# Patient Record
Sex: FEMALE | Race: BLACK OR AFRICAN AMERICAN | NOT HISPANIC OR LATINO
[De-identification: names, ages, dates, MRNs, and addresses within clinical notes are randomized per-mention and may not be internally consistent; named-entity substitution may affect disease eponyms.]

---

## 2020-02-27 ENCOUNTER — APPOINTMENT (OUTPATIENT)
Dept: ORTHOPEDIC SURGERY | Facility: CLINIC | Age: 75
End: 2020-02-27
Payer: MEDICARE

## 2020-02-27 VITALS
HEART RATE: 73 BPM | WEIGHT: 158 LBS | HEIGHT: 65 IN | BODY MASS INDEX: 26.33 KG/M2 | SYSTOLIC BLOOD PRESSURE: 171 MMHG | DIASTOLIC BLOOD PRESSURE: 77 MMHG

## 2020-02-27 DIAGNOSIS — Z96.641 PRESENCE OF RIGHT ARTIFICIAL HIP JOINT: ICD-10-CM

## 2020-02-27 PROCEDURE — 99204 OFFICE O/P NEW MOD 45 MIN: CPT

## 2020-02-27 PROCEDURE — 72100 X-RAY EXAM L-S SPINE 2/3 VWS: CPT

## 2020-02-27 PROCEDURE — 73502 X-RAY EXAM HIP UNI 2-3 VIEWS: CPT | Mod: RT

## 2020-02-27 RX ORDER — AMLODIPINE BESYLATE 5 MG/1
5 TABLET ORAL
Refills: 0 | Status: ACTIVE | COMMUNITY

## 2020-02-27 RX ORDER — SIMVASTATIN 20 MG/1
20 TABLET, FILM COATED ORAL
Refills: 0 | Status: ACTIVE | COMMUNITY

## 2021-04-01 ENCOUNTER — APPOINTMENT (OUTPATIENT)
Dept: ORTHOPEDIC SURGERY | Facility: CLINIC | Age: 76
End: 2021-04-01
Payer: MEDICARE

## 2021-04-01 VITALS
WEIGHT: 158 LBS | DIASTOLIC BLOOD PRESSURE: 82 MMHG | BODY MASS INDEX: 26.33 KG/M2 | SYSTOLIC BLOOD PRESSURE: 175 MMHG | HEIGHT: 65 IN | HEART RATE: 77 BPM

## 2021-04-01 DIAGNOSIS — M25.512 PAIN IN LEFT SHOULDER: ICD-10-CM

## 2021-04-01 PROCEDURE — 99072 ADDL SUPL MATRL&STAF TM PHE: CPT

## 2021-04-01 PROCEDURE — 99214 OFFICE O/P EST MOD 30 MIN: CPT | Mod: 25

## 2021-04-01 PROCEDURE — 20610 DRAIN/INJ JOINT/BURSA W/O US: CPT | Mod: LT

## 2021-04-01 PROCEDURE — 73502 X-RAY EXAM HIP UNI 2-3 VIEWS: CPT | Mod: RT

## 2021-04-01 PROCEDURE — 73030 X-RAY EXAM OF SHOULDER: CPT | Mod: LT

## 2021-04-01 NOTE — DISCUSSION/SUMMARY
[de-identified] : The patient has low back pain secondary to lumbar spondylosis.  The right hip replacement is doing well.  She will apply topical ointments and patches.  In terms of her left shoulder she has degenerative disease.  The pathology, natural history and treatment options were discussed.  She has opted to try a steroid injection.\par Informed consent is obtained. Site and procedure were confirmed with the patient.  Following a sterile prep with alcohol an injection is placed into the subacromial space and glenohumeral joint of the left shoulder. The injection consists of 1 cc of Depo-Medrol and 8 cc of 1% Xylocaine. The injection was well tolerated. Instructions were given.\par She will return as needed.

## 2021-04-01 NOTE — PHYSICAL EXAM
[DP] : dorsalis pedis 2+ and symmetric bilaterally [PT] : posterior tibial 2+ and symmetric bilaterally [Rad] : radial 2+ and symmetric bilaterally [Normal] : Alert and in no acute distress [Poor Appearance] : well-appearing [Acute Distress] : not in acute distress [Obese] : not obese [de-identified] : The patient has no respiratory distress. Mood and affect are normal. The patient is alert and oriented to person, place and time.\par Examination of the cervical spine demonstrates no tenderness, no deformity and no muscle spasm. Cervical spine rotation is 60° to the right, 60° to the left, 75° of extension and 45° of flexion. Neurologic exam of the upper extremities reveals intact sensation to light touch. Motor function is 5 over 5 in all groups. Deep tendon reflexes are 2+ and equal at the biceps, triceps and brachioradialis.\par Examination of the left shoulder demonstrates no deformity. The skin is intact. There is no erythema. There is tenderness anteriorly. Impingement sign is positive There is no instability. Drop arm test is negative. Empty can test is negative. Liftoff test is negative. Cameron test is negative.  She has elevation of 130 degrees right and left.  She has internal rotation to the mid lumbar level right and left.  Belly press test is negative.  Elbow motion is full and pain-free.  There is no elbow instability.  The skin is intact.  There is no lymphedema.\par Examination of the lumbar spine demonstrates tenderness to the right of the midline. There is mild muscle spasm. There is no deformity. Lumbar flexion is 90°, right lateral flexion 10° and left lateral flexion 10°. Straight leg raise test is negative. Lower extremity neurologic exam is intact with regard to sensation, motor function and deep tendon reflexes.\par Trendelenburg is negative.  There is no pain with rotation of either hip.  There is a scar on the posterior right hip from prior surgery.  Knees are stable and nontender.  Calves are soft and nontender.  The skin is intact.  There is no lymphedema. [de-identified] : AP and lateral x-rays of the right hip demonstrate total hip replacement in good position.  AP, transscapular and axillary x-rays of the left shoulder demonstrate mild degenerative changes.  X-rays previously taken of the lumbar spine are reviewed.  She has lumbar spondylosis.

## 2023-06-11 ENCOUNTER — EMERGENCY (EMERGENCY)
Facility: HOSPITAL | Age: 78
LOS: 1 days | Discharge: HOME | End: 2023-06-11
Attending: STUDENT IN AN ORGANIZED HEALTH CARE EDUCATION/TRAINING PROGRAM | Admitting: EMERGENCY MEDICINE
Payer: MEDICAID

## 2023-06-11 VITALS
TEMPERATURE: 98 F | OXYGEN SATURATION: 99 % | SYSTOLIC BLOOD PRESSURE: 156 MMHG | DIASTOLIC BLOOD PRESSURE: 68 MMHG | RESPIRATION RATE: 15 BRPM | HEART RATE: 84 BPM

## 2023-06-11 VITALS
DIASTOLIC BLOOD PRESSURE: 72 MMHG | RESPIRATION RATE: 17 BRPM | HEART RATE: 75 BPM | OXYGEN SATURATION: 100 % | SYSTOLIC BLOOD PRESSURE: 169 MMHG

## 2023-06-11 DIAGNOSIS — Z90.710 ACQUIRED ABSENCE OF BOTH CERVIX AND UTERUS: Chronic | ICD-10-CM

## 2023-06-11 DIAGNOSIS — Z96.641 PRESENCE OF RIGHT ARTIFICIAL HIP JOINT: Chronic | ICD-10-CM

## 2023-06-11 PROCEDURE — 93010 ELECTROCARDIOGRAM REPORT: CPT

## 2023-06-11 PROCEDURE — 70450 CT HEAD/BRAIN W/O DYE: CPT | Mod: 26,MA

## 2023-06-11 PROCEDURE — 71250 CT THORAX DX C-: CPT | Mod: 26,MA

## 2023-06-11 PROCEDURE — 99284 EMERGENCY DEPT VISIT MOD MDM: CPT

## 2023-06-11 RX ORDER — ACETAMINOPHEN 500 MG
975 TABLET ORAL ONCE
Refills: 0 | Status: COMPLETED | OUTPATIENT
Start: 2023-06-11 | End: 2023-06-11

## 2023-06-11 RX ORDER — TETANUS TOXOID, REDUCED DIPHTHERIA TOXOID AND ACELLULAR PERTUSSIS VACCINE, ADSORBED 5; 2.5; 8; 8; 2.5 [IU]/.5ML; [IU]/.5ML; UG/.5ML; UG/.5ML; UG/.5ML
0.5 SUSPENSION INTRAMUSCULAR ONCE
Refills: 0 | Status: COMPLETED | OUTPATIENT
Start: 2023-06-11 | End: 2023-06-11

## 2023-06-11 RX ORDER — LIDOCAINE 4 G/100G
1 CREAM TOPICAL ONCE
Refills: 0 | Status: COMPLETED | OUTPATIENT
Start: 2023-06-11 | End: 2023-06-11

## 2023-06-11 RX ADMIN — TETANUS TOXOID, REDUCED DIPHTHERIA TOXOID AND ACELLULAR PERTUSSIS VACCINE, ADSORBED 0.5 MILLILITER(S): 5; 2.5; 8; 8; 2.5 SUSPENSION INTRAMUSCULAR at 15:55

## 2023-06-11 RX ADMIN — LIDOCAINE 1 PATCH: 4 CREAM TOPICAL at 15:54

## 2023-06-11 RX ADMIN — Medication 975 MILLIGRAM(S): at 15:53

## 2023-06-11 NOTE — ED ADULT TRIAGE NOTE - NS ED TRIAGE AVPU SCALE
Alert-The patient is alert, awake and responds to voice. The patient is oriented to time, place, and person. The triage nurse is able to obtain subjective information.
C/o headache, dizziness, and weakness x 3 days

## 2023-06-11 NOTE — ED PROVIDER NOTE - NS ED ROS FT
REVIEW OF SYSTEMS:  CONSTITUTIONAL: No fever, chills, night sweats, or fatigue  EYES: No eye pain, visual disturbances, or discharge  ENMT:  No difficulty hearing, tinnitus, vertigo; No sinus or throat pain  NECK: No pain or stiffness  RESPIRATORY: No cough, wheezing, or hemoptysis; No shortness of breath  CARDIOVASCULAR: + chest pain; No palpitations, dizziness, or leg swelling  GASTROINTESTINAL: No abdominal or epigastric pain. No nausea, vomiting, or hematemesis; No diarrhea or constipation. No melena or hematochezia.  GENITOURINARY: No dysuria, frequency, hematuria, or incontinence  NEUROLOGICAL: No headaches, memory loss, loss of strength, numbness, or tremors  SKIN: No itching, burning, rashes, or lesions   LYMPH NODES: No enlarged glands  ENDOCRINE: No heat or cold intolerance; No hair loss  MUSCULOSKELETAL: No joint pain or swelling; No muscle, back, or extremity pain  PSYCHIATRIC: No depression, anxiety, mood swings, or difficulty sleeping  HEME/LYMPH: No easy bruising, or bleeding gums  ALLERGY AND IMMUNOLOGIC: No hives or eczema

## 2023-06-11 NOTE — ED ADULT NURSE NOTE - OBJECTIVE STATEMENT
ER pt with S/P fall while walking in the street as per pt the street was not level falling straight forward injured the tip of her nose and pain to left side under breast area/rib area, increase up left arm movements, denies dizziness, SOB, no CP, no blur vision at present time, on CM NSR, lungs clear resp. 18-20b/min, Oxygen Sat. 87-98% RA. pt c/o pain up on breathing.   Pending Xray.      Citlalli Lemus RN

## 2023-06-11 NOTE — ED PROVIDER NOTE - CARE PROVIDER_API CALL
Eddie Luque  Internal Medicine  [id number] [place] [address]  Phone: [phone]  Fax: [phone]  Established Patient  Follow Up Time: 4-6 Days

## 2023-06-11 NOTE — ED ADULT NURSE REASSESSMENT NOTE - NS ED NURSE REASSESS COMMENT FT1
break coverage: patient AOx4 awaiting on CT. meds given as ordered. breathing even and unlabored, will continue to monitor.

## 2023-06-11 NOTE — ED PROVIDER NOTE - PROVIDER TOKENS
PROVIDER:[TOKEN:[[id number]:[id number]],FOLLOWUP:[4-6 Days],ESTABLISHEDPATIENT:[T]]

## 2023-06-11 NOTE — ED PROVIDER NOTE - PROGRESS NOTE DETAILS
Patient made aware of CT findings showing rib fractures but otherwise no emergent findings. Patient amendable to D/C.

## 2023-06-11 NOTE — ED PROVIDER NOTE - OBJECTIVE STATEMENT
77 y/o F PMH of DM2, HLD, right hip replacement coming in after fall. Patient says last night around 7:30 pm, she tripped over elevated pavement after hitting her toe and fell landing on her left chest and her nose. She was able to catch her fall before hitting her head. She denies any fever, chills, confusion, chest pain, palpitations, dyspnea, dizziness, syncope. She occasionally takes aspirin with the last time she took aspirin 3 days ago most likely for primary prevention as she denies any previous strokes or MI. She comes to the ED for aching chest pain that worsens with deep inspiration localized to her lower chest. Denies any contusion, ecchymosis except for her nose.

## 2023-06-11 NOTE — ED PROVIDER NOTE - NSFOLLOWUPINSTRUCTIONS_ED_ALL_ED_FT
Rib Fracture WHAT YOU NEED TO KNOW:    A rib fracture is a crack or break in a rib bone. Rib fractures usually heal within 6 weeks. You should be able to return to your usual activities before that time.  Rib Cage    DISCHARGE INSTRUCTIONS:    Call your local emergency number (911 in the [place]) if:    You have trouble breathing.    You have new or increased pain.  Seek care immediately if:    Your pain does not get better, even after treatment.    You have a fever or a cough.  Call your doctor if:    You have questions or concerns about your condition or care.    Medicines: You may need any of the following:    NSAIDs, such as ibuprofen, help decrease swelling, pain, and fever. This medicine is available with or without a doctor's order. NSAIDs can cause stomach bleeding or kidney problems in certain people. If you take blood thinner medicine, always ask your healthcare provider if NSAIDs are safe for you. Always read the medicine label and follow directions.    Prescription pain medicine may be given. Ask your healthcare provider how to take this medicine safely. Some prescription pain medicines contain acetaminophen. Do not take other medicines that contain acetaminophen without talking to your healthcare provider. Too much acetaminophen may cause liver damage. Prescription pain medicine may cause constipation. Ask your healthcare provider how to prevent or treat constipation.    Take your medicine as directed. Contact your healthcare provider if you think your medicine is not helping or if you have side effects. Tell him or her if you are allergic to any medicine. Keep a list of the medicines, vitamins, and herbs you take. Include the amounts, and when and why you take them. Bring the list or the pill bottles to follow-up visits. Carry your medicine list with you in case of an emergency.  Self-care:    Take deep breaths and cough 10 times each hour. This will decrease your risk for a lung infection. Hug a pillow on your injured side to decrease pain while you take deep breaths. Take a deep breath and hold it for as long as you can. Let the air out and then cough. Deep breaths help open your airway. You may be given an incentive spirometer to help you take deep breaths. Put the plastic piece in your mouth and take a slow, deep breath, then let the air out and cough. Repeat these steps 10 times every hour.    Rest and limit activity as directed. Do not pull, push, or lift objects. Start to do more as your pain decreases. Ask your healthcare provider how much activity you can do.    Apply ice on your chest near your fractured rib for 15 to 20 minutes every hour or as directed. Use an ice pack, or put crushed ice in a plastic bag. Cover it with a towel. Ice helps prevent tissue damage and decreases swelling and pain.  Follow up with your doctor as directed: Write down your questions so you remember to ask them during your visits.

## 2023-06-11 NOTE — ED PROVIDER NOTE - NSICDXFAMILYHX_GEN_ALL_CORE_FT
FAMILY HISTORY:  Family history of hypertension in mother, , 90    Sibling  Still living? Unknown  Family history of stroke, Age at diagnosis: Age Unknown

## 2023-06-11 NOTE — ED PROVIDER NOTE - CLINICAL SUMMARY MEDICAL DECISION MAKING FREE TEXT BOX
79 y/o F PMH of DM2, HLD, right hip replacement coming in after mechanical fall with chest pain resulting for trauma.  - Will assess for rib fractures with chest XR 79 y/o F PMH of DM2, HLD, right hip replacement coming in after mechanical fall with chest pain resulting for trauma.  - Will assess for rib fractures with CT

## 2023-06-11 NOTE — ED PROVIDER NOTE - PATIENT PORTAL LINK FT
You can access the FollowMyHealth Patient Portal offered by [facility] by registering at the following website: [email or web address]. By joining [facility]’s FollowMyHealth portal, you will also be able to view your health information using other applications (apps) compatible with our system.

## 2023-06-11 NOTE — ED PROVIDER NOTE - PHYSICAL EXAMINATION
VITALS:   T(C): 36.9 (06-11-23 @ 11:36), Max: 36.9 (06-11-23 @ 11:36)  HR: 84 (06-11-23 @ 11:36) (84 - 84)  BP: 156/68 (06-11-23 @ 11:36) (156/68 - 156/68)  RR: 15 (06-11-23 @ 11:36) (15 - 15)  SpO2: 99% (06-11-23 @ 11:36) (99% - 99%)    PHYSICAL EXAM:     GENERAL: NAD, lying in bed comfortably  HEAD:  Atraumatic, Normocephalic  EYES: EOMI, PERRLA, conjunctiva and sclera clear  ENT: Moist mucous membranes  NECK: Supple, No JVD  CHEST/LUNG: Clear to auscultation bilaterally; No rales, rhonchi, wheezing, or rubs. Unlabored respirations; focal tenderness noted in lower left ribs and right ribs  HEART: Regular rate and rhythm; No murmurs, rubs, or gallops  ABDOMEN: normal bowel sounds; Soft, nontender, nondistended  EXTREMITIES:  2+ Peripheral Pulses, brisk capillary refill. No clubbing, cyanosis, or edema  Neurological:  A&Ox3, no focal deficits   SKIN: No rashes or lesions  PSYCH: normal affect and mood VITALS:   T(C): 36.9 (06-11-23 @ 11:36), Max: 36.9 (06-11-23 @ 11:36)  HR: 84 (06-11-23 @ 11:36) (84 - 84)  BP: 156/68 (06-11-23 @ 11:36) (156/68 - 156/68)  RR: 15 (06-11-23 @ 11:36) (15 - 15)  SpO2: 99% (06-11-23 @ 11:36) (99% - 99%)    PHYSICAL EXAM:     GENERAL: NAD, lying in bed comfortably  HEAD:  Normocephalic; contusion to nose  EYES: EOMI, PERRLA, conjunctiva and sclera clear  ENT: Moist mucous membranes  NECK: Supple, No JVD  CHEST/LUNG: Clear to auscultation bilaterally; No rales, rhonchi, wheezing, or rubs. Unlabored respirations; focal tenderness noted in lower left ribs and right ribs  HEART: Regular rate and rhythm; No murmurs, rubs, or gallops  ABDOMEN: normal bowel sounds; Soft, nontender, nondistended  EXTREMITIES:  2+ Peripheral Pulses, brisk capillary refill. No clubbing, cyanosis, or edema  Neurological:  A&Ox3, no focal deficits   SKIN: No rashes or lesions  PSYCH: normal affect and mood

## 2023-06-11 NOTE — ED ADULT NURSE NOTE - NSFALLHARMRISKINTERV_ED_ALL_ED

## 2023-06-11 NOTE — ED PROVIDER NOTE - ATTENDING CONTRIBUTION TO CARE
77 yo F, hx DM2, HLD, R hip replacement, presenting for evaluation of anterior chest pain s/p mechanical fall last night. Pt reports tripping on uneven concrete and falling forward onto L side of chest. Denies LOC but with abrasion to bridge of nose. On exam, NC, only abrasion to tip of nose with no ttp over facial bones, EOMI. No cervical/spinal ttp, pt with significant anterior cw ttp. BS ctab, heart rrr, FROM all extremities, ambulating at baseline. Plan for CTH r/o bleed, CT chest r/o rib fx, pain meds and reassess

## 2023-06-11 NOTE — ED ADULT TRIAGE NOTE - CHIEF COMPLAINT QUOTE
states" I fell on uneven pavement last night and my left side of the chest and ribs hurts a lot" takes baby asa on and off. denies LOC .

## 2023-08-10 ENCOUNTER — APPOINTMENT (OUTPATIENT)
Dept: ORTHOPEDIC SURGERY | Facility: CLINIC | Age: 78
End: 2023-08-10
Payer: MEDICARE

## 2023-08-10 VITALS — BODY MASS INDEX: 26.33 KG/M2 | WEIGHT: 158 LBS | HEIGHT: 65 IN

## 2023-08-10 DIAGNOSIS — Z96.641 PRESENCE OF RIGHT ARTIFICIAL HIP JOINT: ICD-10-CM

## 2023-08-10 DIAGNOSIS — M47.816 SPONDYLOSIS W/OUT MYELOPATHY OR RADICULOPATHY, LUMBAR REGION: ICD-10-CM

## 2023-08-10 PROCEDURE — 99214 OFFICE O/P EST MOD 30 MIN: CPT | Mod: 25

## 2023-08-10 PROCEDURE — 72100 X-RAY EXAM L-S SPINE 2/3 VWS: CPT

## 2023-08-10 PROCEDURE — 73502 X-RAY EXAM HIP UNI 2-3 VIEWS: CPT

## 2023-08-10 NOTE — PHYSICAL EXAM
[DP] : dorsalis pedis 2+ and symmetric bilaterally [PT] : posterior tibial 2+ and symmetric bilaterally [Normal] : Alert and in no acute distress [Poor Appearance] : well-appearing [Acute Distress] : not in acute distress [Obese] : not obese [de-identified] : The patient has no respiratory distress. Mood and affect are normal. The patient is alert and oriented to person, place and time. Examination of the lumbar spine demonstrates tenderness to the right of the midline. There is mild muscle spasm. There is no deformity. Lumbar flexion is 90°, right lateral flexion 10° and left lateral flexion 10°. Straight leg raise test is negative. Lower extremity neurologic exam is intact with regard to sensation, motor function and deep tendon reflexes. Examination of the right hip demonstrates well-healed scar from prior surgery.  There is no tenderness of the hip or pelvis.  There is no pain with hip range of motion.  There is no pain with knee range of motion.  The calves are soft and nontender.  The skin is intact.  There is no lymphedema. [de-identified] : AP and lateral x-rays of the right hip with AP of the pelvis demonstrates well-fixed right total hip replacement with no evidence of loosening in comparison to prior x-rays.  AP and lateral x-rays of the lumbar spine demonstrate degenerative changes.  There are no fractures or dislocations.

## 2023-08-10 NOTE — REVIEW OF SYSTEMS
Explained to the patient that she should only order enough CL to get her through until her cataract evaluation/surgery, and not to update her glasses if she is proceeding with cataract surgery soon. Patient expressed understanding. [Joint Pain] : joint pain [Negative] : Heme/Lymph

## 2023-08-10 NOTE — DISCUSSION/SUMMARY
[de-identified] : The patient has a well-functioning right total hip replacement.  I believe that her current pain is secondary to her lumbar spine.  She has lumbar spondylosis which was aggravated by her activities.  She will modify her activities and take over-the-counter medication for her pain.  She will return as needed.

## 2023-08-10 NOTE — HISTORY OF PRESENT ILLNESS
[de-identified] : 78-year-old female s/p THR 12/11/15 presents for reevaluation of her right hip. She has been experiencing intermittent aching pain from the lateral hip which radiates to the lower back for the past month. She feels the pain is worst when she is bent over for a prolonged period while gardening. She has tried Tylenol for pain with some relief.